# Patient Record
Sex: FEMALE | Race: WHITE | Employment: UNEMPLOYED | URBAN - METROPOLITAN AREA
[De-identification: names, ages, dates, MRNs, and addresses within clinical notes are randomized per-mention and may not be internally consistent; named-entity substitution may affect disease eponyms.]

---

## 2018-07-26 PROBLEM — R63.4 ABNORMAL INTENTIONAL WEIGHT LOSS: Status: ACTIVE | Noted: 2018-07-26

## 2018-07-26 PROBLEM — R42 DIZZINESS: Status: ACTIVE | Noted: 2018-07-26

## 2018-07-26 PROBLEM — D64.9 ANEMIA: Status: ACTIVE | Noted: 2018-07-26

## 2018-07-26 PROBLEM — R53.83 FATIGUE: Status: ACTIVE | Noted: 2018-07-26

## 2018-07-26 PROBLEM — H81.10 BENIGN PAROXYSMAL POSITIONAL VERTIGO: Status: ACTIVE | Noted: 2018-07-26

## 2018-08-02 PROBLEM — E53.8 VITAMIN B12 DEFICIENCY: Status: ACTIVE | Noted: 2018-08-02

## 2018-08-16 PROBLEM — D50.9 IRON DEFICIENCY ANEMIA: Status: ACTIVE | Noted: 2018-08-16

## 2018-08-16 PROBLEM — Z11.4 SCREENING FOR HIV (HUMAN IMMUNODEFICIENCY VIRUS): Status: ACTIVE | Noted: 2018-08-16

## 2018-08-23 ENCOUNTER — TELEPHONE (OUTPATIENT)
Dept: ONCOLOGY | Age: 38
End: 2018-08-23

## 2018-08-23 NOTE — TELEPHONE ENCOUNTER
Prior consult canceled due to pt being hospitalized. I called pt to see if she would like to reschedule. Pt states she is following with Dr. Caitlyn Robison at [facility]. Referral status changed.

## 2018-08-30 PROBLEM — E55.9 VITAMIN D DEFICIENCY: Status: ACTIVE | Noted: 2018-08-30

## 2018-09-14 PROBLEM — E05.90 HYPERTHYROIDISM: Status: ACTIVE | Noted: 2018-09-14

## 2018-09-15 PROBLEM — Z11.4 SCREENING FOR HIV (HUMAN IMMUNODEFICIENCY VIRUS): Status: RESOLVED | Noted: 2018-08-16 | Resolved: 2018-09-15

## 2018-09-28 PROBLEM — Z13.220 SCREENING FOR LIPID DISORDERS: Status: ACTIVE | Noted: 2018-08-16

## 2018-10-05 ENCOUNTER — OFFICE VISIT (OUTPATIENT)
Dept: NEUROLOGY | Age: 38
End: 2018-10-05
Payer: COMMERCIAL

## 2018-10-05 VITALS
SYSTOLIC BLOOD PRESSURE: 128 MMHG | WEIGHT: 171.4 LBS | BODY MASS INDEX: 26.9 KG/M2 | HEART RATE: 52 BPM | HEIGHT: 67 IN | DIASTOLIC BLOOD PRESSURE: 77 MMHG

## 2018-10-05 DIAGNOSIS — E53.8 LOW VITAMIN B12 LEVEL: ICD-10-CM

## 2018-10-05 DIAGNOSIS — R20.2 PARESTHESIAS: Primary | ICD-10-CM

## 2018-10-05 DIAGNOSIS — D51.0 VITAMIN B12 DEFICIENCY ANEMIA DUE TO INTRINSIC FACTOR DEFICIENCY: ICD-10-CM

## 2018-10-05 PROCEDURE — 99204 OFFICE O/P NEW MOD 45 MIN: CPT | Performed by: PSYCHIATRY & NEUROLOGY

## 2018-10-05 ASSESSMENT — ENCOUNTER SYMPTOMS
RESPIRATORY NEGATIVE: 1
GASTROINTESTINAL NEGATIVE: 1
EYES NEGATIVE: 1
BACK PAIN: 1
ALLERGIC/IMMUNOLOGIC NEGATIVE: 1

## 2018-10-05 NOTE — COMMUNICATION BODY
Subjective:      Patient ID: Jamie Ann is a 41 y.o. female. HPI  40 yo wf with numbness along with memory complaints . The condition is she reports that she has had low B12 diagnosis with pernicious anemia having double blood transfusion in July with hemoglobin 5.8 . She was found to have low B12 under 50 at the same time . At the time of she was having numbness in her toes , hands and back of head which had started one to two months prior . There was also trouble remembering things like what she is talking about or what she had one . There was dizziness mainly on standing described as lightheadedness and nausea with dizziness improving  with head maneuvers . She was started on B12 IM injections which she is taking every week . She reports to be better with blood tranfusion seeing hematologist Dr Mark Fisher who also gave her double iron infusion . At this time there is some tingling and numbness which is intermittent in right side in her toes and right hand able to shake numbness off  . There is no blurriness of vision although at night she will need to place special focus . There is no color disturbace . There is mild postural instability in her feet loosing footing with rapid movement . She reports in 2009 she had tingling in right toes, right side body tingling found to have low B12 getting IM injections with she stopped on her own after one year not following up medically . B12 137 , October 2017. Folic acid 99.6 , E67 < 50 , July 2018     Past Medical History:   Diagnosis Date    Anemia     Anemia     Headache     Hearing loss     HELLP (hemolytic anemia/elev liver enzymes/low platelets in pregnancy)     Hyperthyroidism 2018    Hypothyroidism 2009    TIA (transient ischemic attack)        History reviewed. No pertinent surgical history.     Family History   Problem Relation Age of Onset    Diabetes Mother     Alzheimer's Disease Maternal Grandmother     Heart Disease Paternal Grandmother x 3   Attention and concentration normal  Short term memory normal  Language process and speech normal . No aphasia   Cranial nerve 2 normal acuety and visual fields  Cranial nerve 3, 4 and 6 . Extraocular muscles are intact . Pupils are equal and reactive   Cranial nerve 5 . Normal strength of masseter and temporalis . Intact corneal reflex. Normal facial sensation  Cranial nerve 7 normal exam   Cranial nerve 8. Grossly intact hearing   Cranial nerve 9 and 10. Symmetric palate elevation   Cranial nerve 11 , 5 out of 5 strength   Cranial Nerve 12 midline tongue . No atrophy  Sensation . Normal proprioception . Intact Vibration . Normal pinprick and light touch   Deep Tendon Reflexes normal  Plantar response flexor bilaterally    Assessment:       Diagnosis Orders   1. Paresthesias  MRI Brain WO Contrast   2. Low vitamin B12 level  MRI Brain WO Contrast   3. Vitamin B12 deficiency anemia due to intrinsic factor deficiency     Patient has low B12 with paresthesias along cognitive complaints which have improved .  Will have her undergo MRI of Head with her having continued followup with hematology       Plan:      Orders Placed This Encounter   Procedures    MRI Brain WO Contrast     Standing Status:   Future     Standing Expiration Date:   10/5/2019           Trinh Lozada MD

## 2018-10-05 NOTE — LETTER
Respiratory . Breathsounds clear bilaterally  Cardiovascular: Auscultation of heart with regular rate and rhythm  Musculoskeletal. Muscle bulk and tone normal                                                           Muscle strength 5/5 strength throughout                                                                                No dysmetria or dysdiadokinesis  No tremor   Normal fine motor  Gait normal   Orientation Alert and oriented x 3   Attention and concentration normal  Short term memory normal  Language process and speech normal . No aphasia   Cranial nerve 2 normal acuety and visual fields  Cranial nerve 3, 4 and 6 . Extraocular muscles are intact . Pupils are equal and reactive   Cranial nerve 5 . Normal strength of masseter and temporalis . Intact corneal reflex. Normal facial sensation  Cranial nerve 7 normal exam   Cranial nerve 8. Grossly intact hearing   Cranial nerve 9 and 10. Symmetric palate elevation   Cranial nerve 11 , 5 out of 5 strength   Cranial Nerve 12 midline tongue . No atrophy  Sensation . Normal proprioception . Intact Vibration . Normal pinprick and light touch   Deep Tendon Reflexes normal  Plantar response flexor bilaterally    Assessment:       Diagnosis Orders   1. Paresthesias  MRI Brain WO Contrast   2. Low vitamin B12 level  MRI Brain WO Contrast   3. Vitamin B12 deficiency anemia due to intrinsic factor deficiency     Patient has low B12 with paresthesias along cognitive complaints which have improved . Will have her undergo MRI of Head with her having continued followup with hematology       Plan:      Orders Placed This Encounter   Procedures    MRI Brain WO Contrast     Standing Status:   Future     Standing Expiration Date:   10/5/2019           Devika Murillo MD    If you have questions, please do not hesitate to call me. I look forward to following Airanna Garcia along with you.     Sincerely,        Devika Murillo MD    CC providers:  Kym Salazar MD

## 2018-10-12 PROBLEM — E53.1 VITAMIN B6 DEFICIENCY: Status: ACTIVE | Noted: 2018-10-12

## 2018-10-28 PROBLEM — Z13.220 SCREENING FOR LIPID DISORDERS: Status: RESOLVED | Noted: 2018-08-16 | Resolved: 2018-10-28

## 2018-10-29 PROBLEM — E60 ZINC DEFICIENCY: Status: ACTIVE | Noted: 2018-10-29

## 2018-11-01 ENCOUNTER — HOSPITAL ENCOUNTER (OUTPATIENT)
Dept: MRI IMAGING | Age: 38
Discharge: HOME | End: 2018-11-03
Payer: COMMERCIAL

## 2018-11-01 DIAGNOSIS — E53.8 LOW VITAMIN B12 LEVEL: ICD-10-CM

## 2018-11-01 DIAGNOSIS — R20.2 PARESTHESIAS: ICD-10-CM

## 2018-11-09 PROBLEM — E03.9 HYPOTHYROIDISM: Status: ACTIVE | Noted: 2018-11-09

## 2020-07-01 ENCOUNTER — TELEPHONE (OUTPATIENT)
Dept: ONCOLOGY | Age: 40
End: 2020-07-01

## 2020-07-01 PROBLEM — D69.6 THROMBOCYTOPENIA (HCC): Status: ACTIVE | Noted: 2020-07-01

## 2020-07-01 PROBLEM — D72.819 LEUKOPENIA: Status: ACTIVE | Noted: 2020-07-01

## 2020-08-04 ENCOUNTER — HOSPITAL ENCOUNTER (OUTPATIENT)
Facility: MEDICAL CENTER | Age: 40
End: 2020-08-04

## 2020-08-06 ENCOUNTER — TELEPHONE (OUTPATIENT)
Dept: GASTROENTEROLOGY | Age: 40
End: 2020-08-06

## 2020-08-06 ENCOUNTER — TELEPHONE (OUTPATIENT)
Dept: ONCOLOGY | Age: 40
End: 2020-08-06

## 2020-08-06 ENCOUNTER — INITIAL CONSULT (OUTPATIENT)
Dept: ONCOLOGY | Age: 40
End: 2020-08-06

## 2020-08-06 VITALS
SYSTOLIC BLOOD PRESSURE: 118 MMHG | HEART RATE: 77 BPM | DIASTOLIC BLOOD PRESSURE: 77 MMHG | TEMPERATURE: 98.3 F | WEIGHT: 210.7 LBS | BODY MASS INDEX: 33.86 KG/M2 | HEIGHT: 66 IN | RESPIRATION RATE: 16 BRPM

## 2020-08-06 PROBLEM — D61.818 PANCYTOPENIA (HCC): Status: ACTIVE | Noted: 2020-07-01

## 2020-08-06 PROCEDURE — 99245 OFF/OP CONSLTJ NEW/EST HI 55: CPT | Performed by: INTERNAL MEDICINE

## 2020-08-06 PROCEDURE — 99202 OFFICE O/P NEW SF 15 MIN: CPT

## 2020-08-06 RX ORDER — CYANOCOBALAMIN 1000 UG/ML
1000 INJECTION INTRAMUSCULAR; SUBCUTANEOUS ONCE
Qty: 10 ML | Refills: 2 | Status: SHIPPED | OUTPATIENT
Start: 2020-08-06 | End: 2021-08-23

## 2020-08-06 RX ORDER — VIT C/B6/B5/MAGNESIUM/HERB 173 50-5-6-5MG
CAPSULE ORAL
COMMUNITY

## 2020-08-06 NOTE — TELEPHONE ENCOUNTER
Received a call from [facility] the [facility] to schedule from referral.  Scheduled for 8/18/20 3 pm at the [place] office. Writer thanked and call ended.

## 2020-08-06 NOTE — LETTER
_    Rey Mishra MD    8/6/2020     Thee Rayo MD   [facility] [id number]    Dear Dr Jeronimo Pac : Thank you for referring Bell Rowe, 1980, to me for evaluation. Below are the relevant portions of my assessment and plan of care. Ms. Bell Rowe is a very pleasant 47 y.o. female with history of multiple co morbidities as listed. The patient is referred for further evaluation and management of anemia and pancytopenia. Patient states that she developed anemia but 2009. At that time she was found to have severe anemia and had work-up which showed vitamin B12 deficiency. She has replacement and she did very well for several years. Patient developed severe anemia again in July 2018. She was hospitalized and she had work-up including bone marrow test which was negative. She had macrocytic anemia along with leukopenia and thrombocytopenia. Patient was treated again with vitamin B12 and she had very good recovery. Recent lab test showed  Macrocytic anemia. The patient is referred for further management. Patient has increasing weakness and fatigue. Occasional dizziness. She has shortness of breath on exertion. She has occasional palpitation. Patient denies any active bleeding. No stomach pain. No heartburn or acid reflux. No history of stomach surgery or ulcers. No history of intestinal problems. Patient has no neurological deficits. Patient denies smoking or alcohol drinking. PAST MEDICAL HISTORY: has a past medical history of Anemia, Anemia, Headache, Hearing loss, HELLP (hemolytic anemia/elev liver enzymes/low platelets in pregnancy), Hyperthyroidism, Hypothyroidism, and TIA (transient ischemic attack). PAST SURGICAL HISTORY: has no past surgical history on file.      CURRENT MEDICATIONS:  has a current medication list which includes the following prescription(s): turmeric, cyanocobalamin, cyanocobalamin, For more than 60 minutes of face to face discussion, I explained to the patient the nature of this hematologic problem. I explained the significance of these abnormalities in layman language. I reviewed the patient's past medical records and old labs and discussed with the patient. Obviously she had significant and severe anemia secondary to vitamin B12 deficiency. She developed also pancytopenia's secondary to vitamin B12 deficiency. She had good recovery on the previous treatment with vitamin B12. I explained to the patient that pancytopenia is part of the vitamin B12 deficiency. Likely she is having pernicious anemia. I would like to refer her to gastroenterology for EGD and possible stomach biopsies for any abnormal findings. In the interim we will prescribe vitamin B12 injections for loading and for maintenance with monthly vitamin B12 injections. Labs will be monitored closely. Patient's questions were answered to the best of her satisfaction and she verbalized full understanding and agreement. If you have questions, please do not hesitate to call me. I look forward to following Tripp Miranda along with you. Sincerely,                            [facility]                          Cell: [phone]    This note is created with the assistance of a speech recognition program.  While intending to generate a document that actually reflects the content of the visit, the document can still have some errors including those of syntax and sound a like substitutions which may escape proof reading. It such instances, actual meaning can be extrapolated by contextual diversion.

## 2020-08-06 NOTE — TELEPHONE ENCOUNTER
Received vm from Rodney Saul with  [address] for clarification of frequency of the cyanocobalamin injections. Reviewed with Dr. Reema Gaitan and orders received for monthly.   Writer updated Randal Jackson @ 1 [phone]

## 2020-08-07 NOTE — PROGRESS NOTES
_               Ms. Bg Robles is a very pleasant 47 y.o. female with history of multiple co morbidities as listed. The patient is referred for further evaluation and management of anemia and pancytopenia. Patient states that she developed anemia but 2009. At that time she was found to have severe anemia and had work-up which showed vitamin B12 deficiency. She has replacement and she did very well for several years. Patient developed severe anemia again in July 2018. She was hospitalized and she had work-up including bone marrow test which was negative. She had macrocytic anemia along with leukopenia and thrombocytopenia. Patient was treated again with vitamin B12 and she had very good recovery. Recent lab test showed  Macrocytic anemia. The patient is referred for further management. Patient has increasing weakness and fatigue. Occasional dizziness. She has shortness of breath on exertion. She has occasional palpitation. Patient denies any active bleeding. No stomach pain. No heartburn or acid reflux. No history of stomach surgery or ulcers. No history of intestinal problems. Patient has no neurological deficits. Patient denies smoking or alcohol drinking. PAST MEDICAL HISTORY: has a past medical history of Anemia, Anemia, Headache, Hearing loss, HELLP (hemolytic anemia/elev liver enzymes/low platelets in pregnancy), Hyperthyroidism, Hypothyroidism, and TIA (transient ischemic attack). PAST SURGICAL HISTORY: has no past surgical history on file. CURRENT MEDICATIONS:  has a current medication list which includes the following prescription(s): turmeric, cyanocobalamin, cyanocobalamin, levothyroxine, vitamin d3, k[employer] ins syr .4ZG/50A, b-6, and folic acid. ALLERGIES:  is allergic to corn oil; monosodium glutamate; and wheat bran.     FAMILY HISTORY: Negative for any hematological or oncological conditions. SOCIAL HISTORY:  reports that she has never smoked. She has never used smokeless tobacco. She reports that she does not drink alcohol or use drugs. REVIEW OF SYSTEMS:     · General: Positive for weakness and fatigue. . No unanticipated weight loss or decreased appetite. No fever or chills. · Eyes: No blurred vision, eye pain or double vision. · Ears: No hearing problems or drainage. No tinnitus. · Throat: No sore throat, problems with swallowing or dysphagia. · Respiratory: No cough, sputum or hemoptysis. Positive for exertional shortness of breath. No pleuritic chest pain. · Cardiovascular: No chest pain, orthopnea or PND. No lower extremity edema. No palpitation. · Gastrointestinal: No problems with swallowing. No abdominal pain or bloating. No nausea or vomiting. No diarrhea or constipation. No GI bleeding. · Genitourinary: No dysuria, hematuria, frequency or urgency. · Musculoskeletal: No muscle aches or pains. No limitation of movement. No back pain. No gait disturbance, No joint complaints. · Dermatologic: No skin rashes or pruritus. No skin lesions or discolorations. · Psychiatric: No depression, anxiety, or stress or signs of schizophrenia. No change in mood or affect. · Hematologic: No history of bleeding tendency. No bruises or ecchymosis. No history of clotting problems. · Infectious disease: No fever, chills or frequent infections. · Endocrine: No polydipsia or polyuria. No temperature intolerance. · Neurologic: No headaches or dizziness. No weakness or numbness of the extremities. No changes in balance, coordination,  memory, mentation, behavior. · Allergic/Immunologic: No nasal congestion or hives. No repeated infections. PHYSICAL EXAM:  The patient is not in acute distress. Vital signs: Blood pressure 118/77, pulse 77, temperature 98.3 °F (36.8 °C), temperature source Oral, resp.  rate 16, height 5' 6\" (1.676 m), weight 210 lb 11.2 oz (95.6 kg). General appearance - well appearing, not in pain or distress  Mental status - good mood, alert and oriented  Eyes - pupils equal and reactive, extraocular eye movements intact  Ears - bilateral TM's and external ear canals normal  Nose - normal and patent, no erythema, discharge or polyps  Mouth - mucous membranes moist, pharynx normal without lesions  Neck - supple, no significant adenopathy  Lymphatics - no palpable lymphadenopathy, no hepatosplenomegaly  Chest - clear to auscultation, no wheezes, rales or rhonchi, symmetric air entry  Heart - normal rate, regular rhythm, normal S1, S2, no murmurs, rubs, clicks or gallops  Abdomen - soft, nontender, nondistended, no masses or organomegaly  Neurological - alert, oriented, normal speech, no focal findings or movement disorder noted  Musculoskeletal - no joint tenderness, deformity or swelling  Extremities - peripheral pulses normal, no pedal edema, no clubbing or cyanosis  Skin - normal coloration and turgor, no rashes, no suspicious skin lesions noted     Review of Diagnostic data:   Lab Results   Component Value Date    WBC 4.6 10/03/2018    HGB 6.0 (A) 07/26/2018    HCT 27.0 (A) 06/29/2020     06/29/2020     06/29/2020       Chemistry        Component Value Date/Time     06/29/2020    K 3.9 06/29/2020     06/29/2020    BUN 19 06/29/2020    CREATININE 0.76 06/29/2020        Component Value Date/Time    AST 31 06/29/2020    ALT 25 06/29/2020    BILITOT 1.3 06/29/2020            IMPRESSION:   Severe vitamin B12 deficiency anemia  Probable pernicious anemia  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 PLAN: I reviewed the labs available to me and discussed with the patient. For more than 60 minutes of face to face discussion, I explained to the patient the nature of this hematologic problem. I explained the significance of these abnormalities in layman language.    I reviewed the patient's past medical records and old labs and discussed with the patient. Obviously she had significant and severe anemia secondary to vitamin B12 deficiency. She developed also pancytopenia's secondary to vitamin B12 deficiency. She had good recovery on the previous treatment with vitamin B12. I explained to the patient that pancytopenia is part of the vitamin B12 deficiency. Likely she is having pernicious anemia. I would like to refer her to gastroenterology for EGD and possible stomach biopsies for any abnormal findings. In the interim we will prescribe vitamin B12 injections for loading and for maintenance with monthly vitamin B12 injections. Labs will be monitored closely. Patient's questions were answered to the best of her satisfaction and she verbalized full understanding and agreement.

## 2020-08-14 ENCOUNTER — TELEPHONE (OUTPATIENT)
Dept: ONCOLOGY | Age: 40
End: 2020-08-14

## 2020-10-16 ENCOUNTER — TELEPHONE (OUTPATIENT)
Dept: ONCOLOGY | Age: 40
End: 2020-10-16

## 2021-01-22 ENCOUNTER — TELEPHONE (OUTPATIENT)
Dept: ONCOLOGY | Age: 41
End: 2021-01-22

## 2021-01-22 NOTE — TELEPHONE ENCOUNTER
#3 LEFT VOICEMAIL FOR PT TO SCHEDULE 6 MONTH F/U  W/ [facility] DARIELA BOUCHER DUE FEB 2021

## 2021-08-23 DIAGNOSIS — E53.8 VITAMIN B12 DEFICIENCY: Primary | ICD-10-CM

## 2021-08-23 NOTE — TELEPHONE ENCOUNTER
RECEIVED INTERFACE REQUEST FROM [facility] FOR REFILL OF VITAMIN B 12     MD FOLLOW UP DUE IN FEB 2021    PEND TO MD FOR REVIEW.

## 2021-08-25 RX ORDER — CYANOCOBALAMIN 1000 UG/ML
INJECTION INTRAMUSCULAR; SUBCUTANEOUS
Qty: 3 ML | Refills: 3 | Status: SHIPPED | OUTPATIENT
Start: 2021-08-25